# Patient Record
Sex: FEMALE | Race: WHITE
[De-identification: names, ages, dates, MRNs, and addresses within clinical notes are randomized per-mention and may not be internally consistent; named-entity substitution may affect disease eponyms.]

---

## 2018-08-17 ENCOUNTER — HOSPITAL ENCOUNTER (EMERGENCY)
Dept: HOSPITAL 52 - LL.ED | Age: 78
Discharge: HOME | End: 2018-08-17
Payer: MEDICARE

## 2018-08-17 DIAGNOSIS — Z88.2: ICD-10-CM

## 2018-08-17 DIAGNOSIS — I10: ICD-10-CM

## 2018-08-17 DIAGNOSIS — Z88.5: ICD-10-CM

## 2018-08-17 DIAGNOSIS — E78.2: ICD-10-CM

## 2018-08-17 DIAGNOSIS — F17.210: ICD-10-CM

## 2018-08-17 DIAGNOSIS — Z79.01: ICD-10-CM

## 2018-08-17 DIAGNOSIS — F02.80: ICD-10-CM

## 2018-08-17 DIAGNOSIS — J44.9: ICD-10-CM
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
## 2018-08-17 DIAGNOSIS — I25.10: ICD-10-CM

## 2018-08-17 DIAGNOSIS — Z79.899: ICD-10-CM

## 2018-08-17 DIAGNOSIS — G30.8: ICD-10-CM

## 2018-08-17 DIAGNOSIS — Z23: ICD-10-CM

## 2018-08-17 DIAGNOSIS — M15.0: ICD-10-CM

## 2018-08-17 DIAGNOSIS — Z91.030: ICD-10-CM

## 2018-08-17 DIAGNOSIS — S50.02XA: Primary | ICD-10-CM
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
